# Patient Record
Sex: MALE | Race: WHITE | NOT HISPANIC OR LATINO | Employment: OTHER | URBAN - METROPOLITAN AREA
[De-identification: names, ages, dates, MRNs, and addresses within clinical notes are randomized per-mention and may not be internally consistent; named-entity substitution may affect disease eponyms.]

---

## 2023-10-03 ENCOUNTER — OFFICE VISIT (OUTPATIENT)
Dept: SLEEP MEDICINE | Facility: HOSPITAL | Age: 43
End: 2023-10-03
Payer: OTHER GOVERNMENT

## 2023-10-03 VITALS — HEIGHT: 71 IN | OXYGEN SATURATION: 97 % | BODY MASS INDEX: 29.4 KG/M2 | WEIGHT: 210 LBS | HEART RATE: 73 BPM

## 2023-10-03 DIAGNOSIS — G47.10 HYPERSOMNIA: ICD-10-CM

## 2023-10-03 DIAGNOSIS — G47.8 NON-RESTORATIVE SLEEP: ICD-10-CM

## 2023-10-03 DIAGNOSIS — G47.63 SLEEP-RELATED BRUXISM: ICD-10-CM

## 2023-10-03 DIAGNOSIS — R06.83 SNORING: ICD-10-CM

## 2023-10-03 DIAGNOSIS — E66.3 OVERWEIGHT WITH BODY MASS INDEX (BMI) 25.0-29.9: ICD-10-CM

## 2023-10-03 DIAGNOSIS — G47.30 SLEEP APNEA, UNSPECIFIED TYPE: Primary | ICD-10-CM

## 2023-10-03 PROCEDURE — G0463 HOSPITAL OUTPT CLINIC VISIT: HCPCS

## 2023-10-03 RX ORDER — SILDENAFIL 25 MG/1
25 TABLET, FILM COATED ORAL DAILY PRN
COMMUNITY

## 2023-10-03 RX ORDER — VALACYCLOVIR HYDROCHLORIDE 1 G/1
TABLET, FILM COATED ORAL AS NEEDED
COMMUNITY
Start: 2023-07-05

## 2023-10-03 NOTE — PROGRESS NOTES
"[facility] New Patient/Consultation       Reason for Consultation: Insomnia snoring restless sleep      Patient Care Team:  Quang Carpenter MD as PCP - General (Family Medicine)  Nicole Rivera MD as Consulting Physician (Sleep Medicine)      History of present illness:  Thank you for asking me to see your patient.  The patient is a 43 y.o. male presents today with concern for sleep disorder.  No history of prior sleep study or tonsillectomy.  Sleeps around 6 hours sleep latency 15 minutes patient reports hypersomnia nonrestorative sleep likely driving her symptoms while driving due to sleepiness weight gain 20 pounds over the past 5 years snoring waking up gasping for breath waking up coughing/choking work morning headaches waking with dry mouth pain disrupting sleep teeth grinding during sleep more sleepy when      Social History: Reports 2 to 3 cigars a year since age 23 2 caffeine beverages a day    Allergies:  Patient has no known allergies.    Family History: MALCOLM no       Current Outpatient Medications:     sildenafil (VIAGRA) 25 MG tablet, Take 1 tablet by mouth Daily As Needed for Erectile Dysfunction., Disp: , Rfl:     valACYclovir (VALTREX) 1000 MG tablet, As Needed., Disp: , Rfl:     Vital Signs:    Vitals:    10/03/23 0900   Pulse: 73   SpO2: 97%   Weight: 95.3 kg (210 lb)   Height: 180.3 cm (71\")      Body mass index is 29.29 kg/m².  Neck Circumference: 14.5 inches      REVIEW OF SYSTEMS.  Full review of systems available on the intake form which is scanned in the media tab.  The relevant positive are noted below  Daytime excessive sleepiness with [place] Sleepiness Scale :Total score: 7   Snoring  TMJ pain shortness of breath abdominal bloating      Physical exam:  Vitals:    10/03/23 0900   Pulse: 73   SpO2: 97%   Weight: 95.3 kg (210 lb)   Height: 180.3 cm (71\")    Body mass index is 29.29 kg/m². Neck Circumference: 14.5 inches  HEENT: Head is atraumatic, normocephalic  Eyes: pupils " are round equal and reacting to light and accommodation, conjunctiva normal  Throat: tongue normal  NECK:Neck Circumference: 14.5 inches  RESPIRATORY SYSTEM: Regular respirations  CARDIOVASULAR SYSTEM: Regular rate  EXTREMITES: No cyanosis, clubbing  NEUROLOGICAL SYSTEM: Oriented x 3, no gross motor defects, gait normal      Impression:  1. Sleep apnea, unspecified type    2. Hypersomnia    3. Non-restorative sleep    4. Snoring    5. Sleep-related bruxism    6. Overweight with body mass index (BMI) 25.0-29.9        Plan:    Good sleep hygiene measures should be maintained.  Weight loss would be beneficial in this patient who is overweight BMI 29.3.    I discussed the pathophysiology of obstructive sleep apnea with the patient.  We discussed the adverse outcomes associated with untreated sleep-disordered breathing.  We discussed treatment modalities of obstructive sleep apnea including CPAP device as well as oral mandibular advancement device. Sleep study will be scheduled to establish definitive diagnosis of sleep disorder breathing.  Weight loss will be strongly beneficial in order to reduce the severity of sleep-disordered breathing.  Caution during activities that require prolonged concentration is strongly advised.  Patient will be notified of sleep study results after sleep study is completed.  If sleep apnea is only mild,  oral mandibular advancement device may be one of the treatment options.  However if sleep apnea is moderately severe, CPAP treatment will be strongly encouraged.  The patient is not opposed to treatment with CPAP device if we confirm significant obstructive sleep apnea on polysomnography.     Thank you for allowing me to participate in your patient's care.    Nicole Rivera MD  Sleep Medicine  10/03/23  10:35 EDT

## 2023-10-23 ENCOUNTER — HOSPITAL ENCOUNTER (OUTPATIENT)
Dept: SLEEP MEDICINE | Facility: HOSPITAL | Age: 43
Discharge: HOME | End: 2023-10-23
Admitting: FAMILY MEDICINE
Payer: OTHER GOVERNMENT

## 2023-10-23 DIAGNOSIS — G47.30 SLEEP APNEA, UNSPECIFIED TYPE: ICD-10-CM

## 2023-10-23 DIAGNOSIS — R06.83 SNORING: ICD-10-CM

## 2023-10-23 DIAGNOSIS — G47.10 HYPERSOMNIA: ICD-10-CM

## 2023-10-23 DIAGNOSIS — G47.8 NON-RESTORATIVE SLEEP: ICD-10-CM

## 2023-10-23 DIAGNOSIS — G47.63 SLEEP-RELATED BRUXISM: ICD-10-CM

## 2023-10-23 PROCEDURE — 95806 SLEEP STUDY UNATT&RESP EFFT: CPT

## 2023-11-03 ENCOUNTER — TELEPHONE (OUTPATIENT)
Dept: SLEEP MEDICINE | Facility: HOSPITAL | Age: 43
End: 2023-11-03
Payer: OTHER GOVERNMENT

## 2023-11-03 NOTE — TELEPHONE ENCOUNTER
Pt called for sleep study results. No follow up needed at sleep center. Patient to follow up with PCP.